# Patient Record
Sex: FEMALE | Race: WHITE | NOT HISPANIC OR LATINO | URBAN - METROPOLITAN AREA
[De-identification: names, ages, dates, MRNs, and addresses within clinical notes are randomized per-mention and may not be internally consistent; named-entity substitution may affect disease eponyms.]

---

## 2018-05-01 ENCOUNTER — EMERGENCY (EMERGENCY)
Facility: HOSPITAL | Age: 23
LOS: 1 days | Discharge: HOME | End: 2018-05-01
Admitting: EMERGENCY MEDICINE
Payer: SELF-PAY

## 2018-05-01 VITALS
OXYGEN SATURATION: 97 % | RESPIRATION RATE: 18 BRPM | WEIGHT: 139.99 LBS | HEART RATE: 82 BPM | DIASTOLIC BLOOD PRESSURE: 97 MMHG | SYSTOLIC BLOOD PRESSURE: 137 MMHG | HEIGHT: 66 IN | TEMPERATURE: 97 F

## 2018-05-01 DIAGNOSIS — R51 HEADACHE: ICD-10-CM

## 2018-05-01 PROCEDURE — 99283 EMERGENCY DEPT VISIT LOW MDM: CPT

## 2018-05-01 PROCEDURE — 99282 EMERGENCY DEPT VISIT SF MDM: CPT

## 2018-05-01 RX ORDER — KETOROLAC TROMETHAMINE 30 MG/ML
30 SYRINGE (ML) INJECTION ONCE
Qty: 0 | Refills: 0 | Status: DISCONTINUED | OUTPATIENT
Start: 2018-05-01 | End: 2018-05-01

## 2018-05-01 NOTE — ED PROVIDER NOTE - OBJECTIVE STATEMENT
23 yo f with no pmh c/o intermittent HA x 2 months. Pain located in the back of the R side of her head and goes into her neck. Pt states HA is gradual in onset and relieved with motrin. Pt states she was having ear congestion x 4 months and saw an ENT 1 month ago and was treated for an ear infection but the HAs persisted. Denies fever, chills, n/v, dizziness, numbness, tingling, weakness, photophobia. Pt states HA is currently mild.

## 2018-05-01 NOTE — ED ADULT NURSE NOTE - OBJECTIVE STATEMENT
Patient c/o of right sided headache, radiates to right ear, no neuro deficits, no fever, no neck tenderness.

## 2018-05-01 NOTE — ED PROVIDER NOTE - PHYSICAL EXAMINATION
CONSTITUTIONAL: Well-appearing; well-nourished; in no apparent distress.   HEAD: Normocephalic; atraumatic.   EYES: PERRL; EOM intact; conjunctiva and sclera clear  ENT: normal nose; no rhinorrhea; normal pharynx with no erythema or lesions.   NECK: Supple; non-tender; no LAD  CARDIOVASCULAR: Normal S1, S2; no murmurs, rubs, or gallops. Regular rate and rhythm.   RESPIRATORY: Breathing easily; breath sounds clear and equal bilaterally; no wheezes, rhonchi, or rales.  EXT: No cyanosis or edema; N/V intact  SKIN: Normal for age and race; warm; dry; good turgor; no apparent lesions or rash.   NEURO: A & O x 3; face symmetric; grossly unremarkable. Normal finger to nose, normal gait.

## 2018-05-01 NOTE — ED PROVIDER NOTE - MEDICAL DECISION MAKING DETAILS
23 yo f with no pmh c/o intermittent HA x 2 months. Pain located in the back of the R side of her head and goes into her neck. Pt states HA is gradual in onset and relieved with motrin. Pt states she was having ear congestion x 4 months and saw an ENT 1 month ago and was treated for an ear infection but the HAs persisted. Currently with mild HA. Neuro exam with no focal deficits. Low suspicion for acute pathology, ddx migraine vs tension HAs. Will refer to neuro

## 2018-05-01 NOTE — ED ADULT NURSE NOTE - CHPI ED SYMPTOMS NEG
no numbness/no vomiting/no tingling/no nausea/no fever/no chills/no decreased eating/drinking/no weakness

## 2018-05-01 NOTE — ED ADULT TRIAGE NOTE - CHIEF COMPLAINT QUOTE
pt c/o intermittent right sided headache x 2 months and ear congestion x 4 months. denies fever, vision changes, n/v.

## 2023-12-07 NOTE — ED ADULT NURSE NOTE - NS ED NOTE ABUSE RESPONSE YN
"Present on admit and noted on CT scan of chest, abdomen and pelvis to have "New compression deformity of the superior endplate of L1 with less than 25% height loss compared to CT from 09/16/2022, age indeterminate." Patient denies any low back pain. No treatment or intervention needed.     " Yes